# Patient Record
(demographics unavailable — no encounter records)

---

## 2024-10-31 NOTE — PHYSICAL EXAM
[No Acute Distress] : no acute distress [Normal Sclera/Conjunctiva] : normal sclera/conjunctiva [No Proptosis] : no proptosis [Normal Outer Ear/Nose] : the ears and nose were normal in appearance [Thyroid Not Enlarged] : the thyroid was not enlarged [No Thyroid Nodules] : no palpable thyroid nodules [Clear to Auscultation] : lungs were clear to auscultation bilaterally [Normal Rate] : heart rate was normal [Regular Rhythm] : with a regular rhythm [No Edema] : no peripheral edema [Pedal Pulses Normal] : the pedal pulses are present [Soft] : abdomen soft [Spine Straight] : spine straight [No Stigmata of Cushings Syndrome] : no stigmata of Cushings Syndrome [Normal Gait] : normal gait [Normal Strength/Tone] : muscle strength and tone were normal [No Rash] : no rash [Normal Reflexes] : deep tendon reflexes were 2+ and symmetric [No Tremors] : no tremors [Oriented x3] : oriented to person, place, and time [Acanthosis Nigricans] : no acanthosis nigricans

## 2024-10-31 NOTE — DATA REVIEWED
[FreeTextEntry1] : Scanned Labs: - 10/20/23: Steatosis Score 0.66 (H; 0.0-0.40),  (H), s.creat 0.63, LDL-c 87, vit D 24.8  - 10/07/22: TSH 0.802, LDL-c 129 (H), Cholesterol 206 (H),  (H), s.creat 0.64, BUN 28 (H), AST 55 (H),  (H), Vit D 25-OH 25 (L)  Scanned Imaging: - 03/18/23 Thyroid US: Findings: LEFT LOBE: Dimensions: : 2.9 cm x 0.6 cm x 0.7 cm, Echotexture: Normal, Vascularity: Normal The left lobe contains one nodule. Nodule 1:Location: Lower pole, Dimensions: 0.5 x 0.4 x 0.5 cm (sagittal x AP x transverse), Composition: Solid, hyperechoic IMPRESSION: Stable hyperechoic left thyroid nodule. - 02/11/21 Thyroid US: R lobe contains no visible nodules. L Lobe contains 1 visible nodule at L Lower Pole, 0.5 x 0.4 x 0.5 cm, solid and echogenic. Isthmus lobe contains no visible nodules. Cervical Lymph Node Evaluation: 1.5 x 0.6 x 0.9cm R Neck Level 3 Lymph node and a 1.6 x 0.6 x 1 cm L Neck Level 4. Lymph Node without suspicious features. No abnormal lymph nodes are identified in the neck. Impression: Single hyperechoic solid nodule seen in the L Thyroid Lower Lobe, unchanged from previous study.  - 06/22/19 Thyroid US: Right lobe contains no visible nodules. Left lobe contains 0.4 cm nodule. - 05/26/18 R thyroid upper pole 0.3 cm solid isoechoic nodule, L thyroid lower pole 0.5 cm solid echogenic nodule, no abnormal LN.

## 2024-10-31 NOTE — HISTORY OF PRESENT ILLNESS
[FreeTextEntry1] : 50 y/o F pt with Hx of Hashimoto's hypothyroidism (dx in 2012), Hx of pre-DM (dx 04/02/18 with A1c 5.8%), and Hx of subcentimeter thyroid nodule ([place]/17), presents for endocrine visit.   Other PMHx: hypercholesterolemia, TYLER Sees GI specialist (Dr. Ishmael Lemus [phone], [phone]) for TYLER.  Lifestyle: Goes to the gym 4x/week with daughter  Irregular Menses PCP: Dr. Bernadette Kaplan (PHONE: [phone])  07/13/2023 CC: "I feel fine" Pt reports having discomfort in the right side of her neck beginning a few days ago. Otherwise, pt states she feels good overall. Denies breathing difficulties, dysphagia, and dysphonia.    11/01/2023 Pt has /79 and BMI 31.59. Pt has lost 6 lbs in 3 months.  CC: "I am doing okay". Pt reports no physical complaints. She presents today for blood test and recommendations. Pt states she is seeing a gastroenterologist. She states she had labs completed by her PCP 2 weeks ago.   Pt brought in labs:  - 10/20/23: Steatosis Score 0.66 (H; 0.0-0.40),  (H), s.creat 0.63, LDL-c 87, vit D 24.8   05/01/2024  Pt has /78 and BMI 32.03. No significant weight change.  CC: "I'm feeling well, no physical complaints. Pt reports that she is making an effort to lose weight, but it is proving difficult for her to do so. She was recently on vacation in [facility] and was unable to take her medications as prescribed in that time. However, she noticed Metformin was causing GI discomfort.  04/27/24 A1c 5.7%, TSH 1.4, free T4 1.4, LDL-c 76   10/30/2024  Pt has /78 and BMI 31.81. No significant weight change.  CC: "I'm feeling well." Pt reports that she has been off Metformin for the past ~4 months. She is doing good and feeling well.    [Medications verified as per pt on 05/01/2024] Current Meds: Levothyroxine 100 mcg qd, Trulicity 4.5 mg qw (Increased from 3 mg on 05/01/24, increase from 1.5 mg qw 11/01/2023), Simvastatin 20 mg HELD: Metformin 1000 mg qd Medication modified/added this visit: Decrease Levothyroxine to 88 mcg qd

## 2024-10-31 NOTE — END OF VISIT
[FreeTextEntry3] :  All medical record entries made by the Scribe were at my, Dr. Kb Wood, direction and personally dictated by me on 10/30/2024. I have reviewed the chart and agree that the record accurately reflects my personal performance of the history, physical exam, assessment and plan. I have also personally directed, reviewed and agreed with the chart. [Time Spent: ___ minutes] : I have spent [unfilled] minutes of time on the encounter which excludes teaching and separately reported services.

## 2024-10-31 NOTE — ASSESSMENT
[Carbohydrate Consistent Diet] : carbohydrate consistent diet [Importance of Diet and Exercise] : importance of diet and exercise to improve glycemic control, achieve weight loss and improve cardiovascular health [Weight Loss] : weight loss [Levothyroxine] : The patient was instructed to take Levothyroxine on an empty stomach, separate from vitamins, and wait at least 30 minutes before eating [FreeTextEntry4] : overview on fatty liver disease.

## 2024-10-31 NOTE — HISTORY OF PRESENT ILLNESS
[FreeTextEntry1] : 52 y/o F pt with Hx of Hashimoto's hypothyroidism (dx in 2012), Hx of pre-DM (dx 04/02/18 with A1c 5.8%), and Hx of subcentimeter thyroid nodule ([place] on 11/18/17), presents for endocrine visit.   Other PMHx: hypercholesterolemia, TYLER Sees GI specialist (Dr. Ishmael Lemus [phone], [phone]) for TYLER.  Lifestyle: Goes to the gym 4x/week with daughter  Irregular Menses PCP: Dr. Bernadette Kaplan (PHONE: [phone])  07/13/2023 CC: "I feel fine" Pt reports having discomfort in the right side of her neck beginning a few days ago. Otherwise, pt states she feels good overall. Denies breathing difficulties, dysphagia, and dysphonia.    11/01/2023 Pt has /79 and BMI 31.59. Pt has lost 6 lbs in 3 months.  CC: "I am doing okay". Pt reports no physical complaints. She presents today for blood test and recommendations. Pt states she is seeing a gastroenterologist. She states she had labs completed by her PCP 2 weeks ago.   Pt brought in labs:  - 10/20/23: Steatosis Score 0.66 (H; 0.0-0.40),  (H), s.creat 0.63, LDL-c 87, vit D 24.8   05/01/2024  Pt has /78 and BMI 32.03. No significant weight change.  CC: "I'm feeling well, no physical complaints. Pt reports that she is making an effort to lose weight, but it is proving difficult for her to do so. She was recently on vacation in [facility] and was unable to take her medications as prescribed in that time. However, she noticed Metformin was causing GI discomfort.  04/27/24 A1c 5.7%, TSH 1.4, free T4 1.4, LDL-c 76   10/30/2024  Pt has /78 and BMI 31.81. No significant weight change.  CC: "I'm feeling well." Pt reports that she has been off Metformin for the past ~4 months. She is doing good and feeling well.    [Medications verified as per pt on 05/01/2024] Current Meds: Levothyroxine 100 mcg qd, Trulicity 4.5 mg qw (Increased from 3 mg on 05/01/24, increase from 1.5 mg qw 11/01/2023), Simvastatin 20 mg HELD: Metformin 1000 mg qd Medication modified/added this visit: Decrease Levothyroxine to 88 mcg qd